# Patient Record
Sex: FEMALE | Race: WHITE | HISPANIC OR LATINO | Employment: FULL TIME | URBAN - METROPOLITAN AREA
[De-identification: names, ages, dates, MRNs, and addresses within clinical notes are randomized per-mention and may not be internally consistent; named-entity substitution may affect disease eponyms.]

---

## 2022-11-06 ENCOUNTER — HOSPITAL ENCOUNTER (EMERGENCY)
Facility: HOSPITAL | Age: 27
Discharge: HOME | End: 2022-11-06
Attending: STUDENT IN AN ORGANIZED HEALTH CARE EDUCATION/TRAINING PROGRAM
Payer: MEDICAID

## 2022-11-06 VITALS
TEMPERATURE: 98 F | RESPIRATION RATE: 18 BRPM | HEIGHT: 58 IN | OXYGEN SATURATION: 98 % | HEART RATE: 67 BPM | DIASTOLIC BLOOD PRESSURE: 52 MMHG | WEIGHT: 165 LBS | BODY MASS INDEX: 34.63 KG/M2 | SYSTOLIC BLOOD PRESSURE: 107 MMHG

## 2022-11-06 DIAGNOSIS — N61.0 MASTITIS: Primary | ICD-10-CM

## 2022-11-06 PROCEDURE — 99283 EMERGENCY DEPT VISIT LOW MDM: CPT

## 2022-11-06 RX ORDER — DICLOXACILLIN SODIUM 500 MG/1
500 CAPSULE ORAL EVERY 6 HOURS
Qty: 40 CAPSULE | Refills: 0 | Status: SHIPPED | OUTPATIENT
Start: 2022-11-06 | End: 2022-11-16

## 2022-11-06 NOTE — ED NOTES
"Pt complains of bilateral pain of both nipples, also reports nipples being dry, cracked, and discolored from breastfeeding. Pt states " I believe I had mastitis last week".    "

## 2022-11-06 NOTE — ED PROVIDER NOTES
Encounter Date: 11/6/2022    SCRIBE #1 NOTE: I, Kristinatyree Fragoso, am scribing for, and in the presence of,  Michael Kendrick MD.     History     Chief Complaint   Patient presents with    Breast Pain     Patient is breast feeding x 5 weeks, CO pain to both nipples are painful, cracked, and discolored.     Time seen by provider: 5:42 PM on 11/06/2022    Piper Bobo is a 27 y.o. female who presents to the ED with an onset of bilateral breast pain. Patient is pumping and states 2 weeks ago, left breast turned purple and became painful. She also had fever and chills. Sx eventually resolved as she continued to pump and taking ibuprofen. Patient was not evaluated, at the time, but believes she had mastitis. Since episode, she has been producing less milk in left breast. Patient had f/u with OB/GYN 2 days ago at [facility] and mentioned Sx she was experiencing 2 weeks ago, but she was not having any issues with breasts at appointment. However today, she c/o bilateral nipple pain with dry, cracked skin, soreness, and discoloration noted to nipple area. Pain persists whether she is pumping or not. She has been applying Lanolin night cream to nipple area before and after pumping and has been massaging breasts in the shower which has provided little to no relief. Patient expresses concerns for possible infection and believes she may also have a clogged duct. She has not noticed any signs of infections in her child's mouth. The patient denies any other symptoms at this time. No pertinent PMHx or PSHx.    The history is provided by the patient.   Review of patient's allergies indicates:  No Known Allergies  No past medical history on file.  No past surgical history on file.  No family history on file.  Social History     Tobacco Use    Smoking status: Never    Smokeless tobacco: Never   Substance Use Topics    Alcohol use: No    Drug use: No     Review of Systems   Constitutional:  Negative for chills and fever.   HENT:   Negative for sore throat.    Respiratory:  Negative for shortness of breath.    Cardiovascular:  Negative for chest pain.   Gastrointestinal:  Negative for nausea.   Genitourinary:  Negative for dysuria.   Musculoskeletal:  Negative for back pain.   Skin:  Positive for color change. Negative for rash.        Positive for breast pain.   Neurological:  Negative for weakness.   Hematological:  Does not bruise/bleed easily.     Physical Exam     Initial Vitals [11/06/22 1702]   BP Pulse Resp Temp SpO2   (!) 107/52 67 18 98.1 °F (36.7 °C) 98 %      MAP       --         Physical Exam    Nursing note and vitals reviewed.  Constitutional: She appears well-developed and well-nourished.   HENT:   Head: Normocephalic and atraumatic.   Eyes: Conjunctivae are normal.   Neck: Neck supple.   Normal range of motion.  Cardiovascular:  Normal rate, regular rhythm and normal heart sounds.     Exam reveals no gallop and no friction rub.       No murmur heard.  Pulmonary/Chest: Effort normal and breath sounds normal. No respiratory distress. She has no wheezes. She has no rhonchi. She has no rales. Right breast exhibits skin change. Left breast exhibits skin change.   Nipple irritation bilaterally, left > right. Breaks in skin around nipples. Hot to touch.   Abdominal: Abdomen is soft. She exhibits no distension. There is no abdominal tenderness.   Musculoskeletal:         General: Normal range of motion.      Cervical back: Normal range of motion and neck supple.     Neurological: She is alert and oriented to person, place, and time.   Skin: Skin is warm and dry. No erythema.   Psychiatric: She has a normal mood and affect.       ED Course   Procedures  Labs Reviewed - No data to display       Imaging Results    None          Medications - No data to display  Medical Decision Making:   History:   Old Medical Records: I decided to obtain old medical records.  ED Management:  27-year-old female with pain and irritation to bilateral  nipples.  Description seems most consistent with episode of mastitis that is improved.  Advised continued pumping and feeding.  Medicine prescribed should be safe for breastfeeding.  Advised continue emollliant usage and witch-thomas pads, and follow-up with obstetrician and pediatrician.  Return to ED if symptoms worsen or change in character.          Scribe Attestation:   Scribe #1: I performed the above scribed service and the documentation accurately describes the services I performed. I attest to the accuracy of the note.            I, Michael Kendrick MD personally performed the services described in this documentation. All medical record entries made by the scribe were at my direction and in my presence.  I have reviewed the chart and agree that the record reflects my personal performance and is accurate and complete.   11:42 PM 11/06/2022       DISCLAIMER: This note was prepared with [employer] voice recognition transcription software. Garbled syntax, mangled pronouns, and other bizarre constructions may be attributed to that software system.         Clinical Impression:   Final diagnoses:  [N61.0] Mastitis (Primary)      ED Disposition Condition    Discharge Stable          ED Prescriptions       Medication Sig Dispense Start Date End Date Auth. Provider    dicloxacillin (DYNAPEN) 500 MG capsule Take 1 capsule (500 mg total) by mouth every 6 (six) hours. for 10 days 40 capsule 11/6/2022 11/16/2022 Michael Kendrick MD          Follow-up Information       Follow up With Specialties Details Why Contact Info    Your obstetrician/pediatrician  Schedule an appointment as soon as possible for a visit in 2 days For follow-up on today's visit.     [facility] Emergency Medicine Go to  As needed, If symptoms worsen [address] [place] [id number] [phone]             Michael Kendrick MD  11/06/22 6156

## 2023-11-09 ENCOUNTER — HOSPITAL ENCOUNTER (EMERGENCY)
Facility: HOSPITAL | Age: 28
Discharge: HOME | End: 2023-11-09
Attending: EMERGENCY MEDICINE
Payer: MEDICAID

## 2023-11-09 VITALS
DIASTOLIC BLOOD PRESSURE: 50 MMHG | TEMPERATURE: 99 F | RESPIRATION RATE: 19 BRPM | SYSTOLIC BLOOD PRESSURE: 94 MMHG | HEART RATE: 77 BPM | OXYGEN SATURATION: 100 % | WEIGHT: 145 LBS | HEIGHT: 58 IN | BODY MASS INDEX: 30.44 KG/M2

## 2023-11-09 DIAGNOSIS — O20.9 VAGINAL BLEEDING IN PREGNANCY, FIRST TRIMESTER: Primary | ICD-10-CM

## 2023-11-09 DIAGNOSIS — O20.0 THREATENED MISCARRIAGE IN EARLY PREGNANCY: ICD-10-CM

## 2023-11-09 DIAGNOSIS — R10.9 ABDOMINAL CRAMPING: ICD-10-CM

## 2023-11-09 LAB
ABO + RH BLD: NORMAL
ALBUMIN SERPL BCP-MCNC: 4.3 G/DL (ref 3.5–5.2)
ALP SERPL-CCNC: 40 U/L (ref 55–135)
ALT SERPL W/O P-5'-P-CCNC: 9 U/L (ref 10–44)
ANION GAP SERPL CALC-SCNC: 7 MMOL/L (ref 8–16)
AST SERPL-CCNC: 11 U/L (ref 10–40)
B-HCG UR QL: POSITIVE
BASOPHILS # BLD AUTO: 0.04 K/UL (ref 0–0.2)
BASOPHILS NFR BLD: 0.5 % (ref 0–1.9)
BILIRUB SERPL-MCNC: 0.3 MG/DL (ref 0.1–1)
BILIRUB UR QL STRIP: NEGATIVE
BUN SERPL-MCNC: 11 MG/DL (ref 6–20)
CALCIUM SERPL-MCNC: 9.4 MG/DL (ref 8.7–10.5)
CHLORIDE SERPL-SCNC: 105 MMOL/L (ref 95–110)
CLARITY UR: ABNORMAL
CO2 SERPL-SCNC: 24 MMOL/L (ref 23–29)
COLOR UR: YELLOW
CREAT SERPL-MCNC: 0.5 MG/DL (ref 0.5–1.4)
CTP QC/QA: YES
DIFFERENTIAL METHOD: ABNORMAL
EOSINOPHIL # BLD AUTO: 0.1 K/UL (ref 0–0.5)
EOSINOPHIL NFR BLD: 1.2 % (ref 0–8)
ERYTHROCYTE [DISTWIDTH] IN BLOOD BY AUTOMATED COUNT: 12.7 % (ref 11.5–14.5)
EST. GFR  (NO RACE VARIABLE): >60 ML/MIN/1.73 M^2
GLUCOSE SERPL-MCNC: 79 MG/DL (ref 70–110)
GLUCOSE UR QL STRIP: NEGATIVE
HCG INTACT+B SERPL-ACNC: NORMAL MIU/ML
HCT VFR BLD AUTO: 31.1 % (ref 37–48.5)
HGB BLD-MCNC: 10.3 G/DL (ref 12–16)
HGB UR QL STRIP: NEGATIVE
IMM GRANULOCYTES # BLD AUTO: 0.01 K/UL (ref 0–0.04)
IMM GRANULOCYTES NFR BLD AUTO: 0.1 % (ref 0–0.5)
KETONES UR QL STRIP: NEGATIVE
LEUKOCYTE ESTERASE UR QL STRIP: NEGATIVE
LYMPHOCYTES # BLD AUTO: 3.5 K/UL (ref 1–4.8)
LYMPHOCYTES NFR BLD: 40.2 % (ref 18–48)
MCH RBC QN AUTO: 28.8 PG (ref 27–31)
MCHC RBC AUTO-ENTMCNC: 33.1 G/DL (ref 32–36)
MCV RBC AUTO: 87 FL (ref 82–98)
MONOCYTES # BLD AUTO: 0.6 K/UL (ref 0.3–1)
MONOCYTES NFR BLD: 7.3 % (ref 4–15)
NEUTROPHILS # BLD AUTO: 4.4 K/UL (ref 1.8–7.7)
NEUTROPHILS NFR BLD: 50.7 % (ref 38–73)
NITRITE UR QL STRIP: NEGATIVE
NRBC BLD-RTO: 0 /100 WBC
PH UR STRIP: >8 [PH] (ref 5–8)
PLATELET # BLD AUTO: 292 K/UL (ref 150–450)
PMV BLD AUTO: 10.7 FL (ref 9.2–12.9)
POTASSIUM SERPL-SCNC: 4 MMOL/L (ref 3.5–5.1)
PROT SERPL-MCNC: 7.5 G/DL (ref 6–8.4)
PROT UR QL STRIP: ABNORMAL
RBC # BLD AUTO: 3.58 M/UL (ref 4–5.4)
SODIUM SERPL-SCNC: 136 MMOL/L (ref 136–145)
SP GR UR STRIP: 1.02 (ref 1–1.03)
URN SPEC COLLECT METH UR: ABNORMAL
UROBILINOGEN UR STRIP-ACNC: ABNORMAL EU/DL
WBC # BLD AUTO: 8.63 K/UL (ref 3.9–12.7)

## 2023-11-09 PROCEDURE — 85025 COMPLETE CBC W/AUTO DIFF WBC: CPT | Performed by: NURSE PRACTITIONER

## 2023-11-09 PROCEDURE — 81003 URINALYSIS AUTO W/O SCOPE: CPT | Performed by: NURSE PRACTITIONER

## 2023-11-09 PROCEDURE — 25000003 PHARM REV CODE 250: Performed by: NURSE PRACTITIONER

## 2023-11-09 PROCEDURE — 80053 COMPREHEN METABOLIC PANEL: CPT | Performed by: NURSE PRACTITIONER

## 2023-11-09 PROCEDURE — 96360 HYDRATION IV INFUSION INIT: CPT

## 2023-11-09 PROCEDURE — 86901 BLOOD TYPING SEROLOGIC RH(D): CPT | Performed by: NURSE PRACTITIONER

## 2023-11-09 PROCEDURE — 96361 HYDRATE IV INFUSION ADD-ON: CPT

## 2023-11-09 PROCEDURE — 84702 CHORIONIC GONADOTROPIN TEST: CPT | Performed by: NURSE PRACTITIONER

## 2023-11-09 PROCEDURE — 81025 URINE PREGNANCY TEST: CPT | Performed by: NURSE PRACTITIONER

## 2023-11-09 PROCEDURE — 99284 EMERGENCY DEPT VISIT MOD MDM: CPT | Mod: 25

## 2023-11-09 RX ADMIN — SODIUM CHLORIDE 1000 ML: 0.9 INJECTION, SOLUTION INTRAVENOUS at 08:11

## 2023-11-09 NOTE — FIRST PROVIDER EVALUATION
"Medical screening examination initiated.  I have conducted a focused provider triage encounter, findings are as follows:    Brief history of present illness:  Presents with complaint of spotting for 10 days and abdominal cramping.  Patient is 8 weeks pregnant.  She is had 2 beta-hCG this week at another facility.  The beta quant is increasing.    Vitals:    11/09/23 1506   BP: 108/66   BP Location: Left arm   Patient Position: Sitting   Pulse: 82   Resp: 18   Temp: 99.2 °F (37.3 °C)   TempSrc: Oral   SpO2: 98%   Weight: 65.8 kg (145 lb)   Height: 4' 10" (1.473 m)       Pertinent physical exam:  Patient is here for an ultrasound.    Brief workup plan:  Ultrasound and labs    Preliminary workup initiated; this workup will be continued and followed by the physician or advanced practice provider that is assigned to the patient when roomed.  "

## 2023-11-10 NOTE — ED PROVIDER NOTES
Encounter Date: 11/9/2023       History     Chief Complaint   Patient presents with    Vaginal Bleeding     Vaginal spotting x 10 days. Sent here by OB for US. Pt reports some mild cramps and lower back pain.      Presents with complaint of spotting on and off for 2 weeks.  She is been her OBGYN and has had 2 beta HCGs.  The 2nd was greater than the 1st.  For some reason they did not have an ultrasound there so she was sent here for an ultrasound.  This is her 4th pregnancy.  She has 3 boys.  No history of miscarriages.  She reports abdominal cramping on and off the past 2 weeks.  She denies fever nausea vomiting or diarrhea.  She denies weakness.      Review of patient's allergies indicates:  No Known Allergies  No past medical history on file.  No past surgical history on file.  No family history on file.  Social History     Tobacco Use    Smoking status: Never    Smokeless tobacco: Never   Substance Use Topics    Alcohol use: No    Drug use: No     Review of Systems   Constitutional:  Negative for fever.   Respiratory:  Negative for cough, shortness of breath and wheezing.    Cardiovascular:  Negative for chest pain, palpitations and leg swelling.   Gastrointestinal:  Negative for abdominal pain, diarrhea, nausea and vomiting.        Abdominal cramping and spotting   Genitourinary:  Positive for vaginal bleeding. Negative for decreased urine volume, difficulty urinating, dysuria, hematuria, pelvic pain, vaginal discharge and vaginal pain.   Musculoskeletal:  Negative for back pain.   Skin:  Negative for rash.   Neurological:  Negative for weakness.       Physical Exam     Initial Vitals [11/09/23 1506]   BP Pulse Resp Temp SpO2   108/66 82 18 99.2 °F (37.3 °C) 98 %      MAP       --         Physical Exam    Constitutional: She appears well-developed and well-nourished.   HENT:   Head: Normocephalic.   Mouth/Throat: Oropharynx is clear and moist.   Eyes: Conjunctivae are normal.   Neck: Neck supple.   Normal range  of motion.  Cardiovascular:  Normal rate and regular rhythm.           Pulmonary/Chest: Breath sounds normal. No respiratory distress.   Abdominal: Abdomen is soft. Bowel sounds are normal. She exhibits no distension. There is no abdominal tenderness.   Genitourinary:    No vaginal discharge.      Genitourinary Comments: Currently there is no vaginal bleeding.  Patient is os is closed.  There is no vaginal discharge.  Negative for cervical motion tenderness or adnexal tenderness.     Musculoskeletal:         General: Normal range of motion.      Cervical back: Normal range of motion and neck supple.      Comments: Patient is ambulatory per self her gait is steady     Neurological: She is alert and oriented to person, place, and time. No sensory deficit. GCS score is 15. GCS eye subscore is 4. GCS verbal subscore is 5. GCS motor subscore is 6.   Skin: Skin is warm and dry. Capillary refill takes less than 2 seconds.   Psychiatric: She has a normal mood and affect. Thought content normal.         ED Course   Procedures  Labs Reviewed   CBC W/ AUTO DIFFERENTIAL - Abnormal; Notable for the following components:       Result Value    RBC 3.58 (*)     Hemoglobin 10.3 (*)     Hematocrit 31.1 (*)     All other components within normal limits    Narrative:     Release to patient->Immediate   COMPREHENSIVE METABOLIC PANEL - Abnormal; Notable for the following components:    Alkaline Phosphatase 40 (*)     ALT 9 (*)     Anion Gap 7 (*)     All other components within normal limits    Narrative:     Release to patient->Immediate   URINALYSIS, REFLEX TO URINE CULTURE - Abnormal; Notable for the following components:    Appearance, UA Hazy (*)     pH, UA >8.0 (*)     Protein, UA Trace (*)     Urobilinogen, UA 2.0-3.0 (*)     All other components within normal limits    Narrative:     Specimen Source->Urine   POCT URINE PREGNANCY - Abnormal; Notable for the following components:    POC Preg Test, Ur Positive (*)     All other  components within normal limits   HCG, QUANTITATIVE    Narrative:     Release to patient->Immediate   GROUP & RH          Imaging Results              US OB Transvaginal (Final result)  Result time 11/09/23 20:50:26   Procedure changed from US OB <14 Wks, TransAbd, Single Gestation     Final result by Castillo Macario DO (11/09/23 20:50:26)                   Narrative:    EXAMINATION: US OB TRANSVAGINAL    HISTORY: abd cramping    COMPARISON: None  LMP: 9/16/2023    TECHNIQUE: Transvaginal ultrasound evaluation of maternal and fetal anatomic structures was performed using grayscale and color Doppler modalities.    FINDINGS:    UTERUS: Myometrium appears normal. Cervix is long and closed.    GESTATION:  Gestational Sac: Present. No perigestational hemorrhage.  Yolk Sac: Present with normal morphology.  Fetus/Embryo: Single present.  Cardiac activity: 146 bpm.  Crown rump length is 10 mm corresponding to estimated gestational age of 7 weeks 1 day.    RIGHT OVARY: Measures 3.5 x 2.2 x 2.5 cm, volume of 10 mL. Ovary is normal containing a 2 cm corpus luteum. Normal color and spectral Doppler flow.    LEFT OVARY: Unable to visualize.    ADNEXA: No mass.    FREE FLUID: None.    DATING:  Gestational age on US today (AUA): 7 weeks 1 day, with ERMIAS 6/26/2024.    Gestational age (based on LMP): 7 weeks 5 days, with ERMIAS 6/22/2024.    IMPRESSION:    1.  Single, living, intrauterine pregnancy as described.  2.  No acute findings    Electronically signed by:  Castillo Macario DO  11/09/2023 08:50 PM CST Workstation: [id number]                                     Medications   sodium chloride 0.9% bolus 1,000 mL 1,000 mL (1,000 mLs Intravenous New Bag 11/9/23 2006)     Medical Decision Making  Presents for ultrasound.  Patient is early pregnancy approximately 7 weeks.  For the past 2 weeks she has had spotting and abdominal cramping on and off.  She has seen her OBGYN twice.  Her beta HCGs have been rising.  They did not have an  ultrasound machine.  She was sent here for an ultrasound.    Amount and/or Complexity of Data Reviewed  Labs: ordered.     Details: Her labs are within normal limits Including her beta-hCG.  Radiology: ordered.     Details: Ultrasound shows a live intrauterine pregnancy at 7 weeks 1 day.  Discussion of management or test interpretation with external provider(s): Patient has been instructed to follow up with her doctor on Monday as she is an appointment that day.  She is to call him tomorrow and give him the results of her beta-hCG.  I have copied all of her reports from here as she is being followed by physician at [facility].  At discharge is patient appeared to be in no acute distress and at no time while in the ED did she appear to be in any acute distress.  She was given strict return precautions.                               Clinical Impression:   Final diagnoses:  [R10.9] Abdominal cramping  [O20.9] Vaginal bleeding in pregnancy, first trimester (Primary)  [O20.0] Threatened miscarriage in early pregnancy        ED Disposition Condition    Discharge Stable          ED Prescriptions    None       Follow-up Information       Follow up With Specialties Details Why Contact Info    Byron Robert MD Family Medicine In 3 days  [address] [place] [id number] [phone]               Chastity Lopez NP  11/09/23 2200       Chastity Lopze NP  11/09/23 220